# Patient Record
Sex: FEMALE | Race: BLACK OR AFRICAN AMERICAN
[De-identification: names, ages, dates, MRNs, and addresses within clinical notes are randomized per-mention and may not be internally consistent; named-entity substitution may affect disease eponyms.]

---

## 2021-05-12 PROBLEM — Z00.00 ENCOUNTER FOR PREVENTIVE HEALTH EXAMINATION: Status: ACTIVE | Noted: 2021-05-12

## 2021-05-21 ENCOUNTER — APPOINTMENT (OUTPATIENT)
Dept: VASCULAR SURGERY | Facility: CLINIC | Age: 61
End: 2021-05-21
Payer: COMMERCIAL

## 2021-05-21 DIAGNOSIS — S83.206A UNSPECIFIED TEAR OF UNSPECIFIED MENISCUS, CURRENT INJURY, RIGHT KNEE, INITIAL ENCOUNTER: ICD-10-CM

## 2021-05-21 PROCEDURE — 93971 EXTREMITY STUDY: CPT

## 2021-05-21 PROCEDURE — 99072 ADDL SUPL MATRL&STAF TM PHE: CPT

## 2021-05-21 PROCEDURE — 99242 OFF/OP CONSLTJ NEW/EST SF 20: CPT

## 2021-05-25 NOTE — PHYSICAL EXAM
[Alert] : alert [Oriented to Person] : oriented to person [Oriented to Place] : oriented to place [Oriented to Time] : oriented to time [Calm] : calm [2+] : right 2+ [Ankle Swelling (On Exam)] : not present [Varicose Veins Of Lower Extremities] : not present [] : not present [Abdomen Tenderness] : ~T ~M No abdominal tenderness [de-identified] : Well appearing  [de-identified] : NC/AT  [de-identified] : Supple  [de-identified] : FROM [de-identified] : RLE: mild R calf swelling with no reproducible pain to palpation. No palpable cords.

## 2021-05-25 NOTE — HISTORY OF PRESENT ILLNESS
[FreeTextEntry1] : 61 y/o F never smoker with recent episode of meniscus tear to the R leg presents to the office for initial consultation of R calf swelling. Pt referred by Dr. Neil Panchal. Patient reports feeling well overall; she does mentions slight discomfort to the leg. She is concern symptoms are due to a blood clot. Otherwise she denies any LE open sores/ulcers, fever, chills, sob, cp.

## 2021-05-25 NOTE — ADDENDUM
[FreeTextEntry1] : This note was written by Isela Nuno on 05/21/2021 acting as scribe for Maren Sandy M.D.\par \par I, Dr.Vicken Mcclendon, personally performed the evaluation and management (E/M) services for this new patient.  That E/M includes conducting the initial examination, assessing all conditions, and establishing the plan of care.  Today, my ACP, SAGRARIO Farooq, was here to observe my evaluation and management services for this patient to be followed going forward.\par \par \par \par

## 2021-05-25 NOTE — ASSESSMENT
[Arterial/Venous Disease] : arterial/venous disease [Medication Management] : medication management [FreeTextEntry1] : 59 y/o F here with R calf swelling. On exam, RLE: mild R calf swelling with no reproducible pain to palpation. No palpable cords. No skin breakdown.\par \par Plan: \par Reviewed RLE venous US completed in the office today showing: Negative DVT/SVT. \par Patient recommended she continue to stay active through daily walking. \par I explained R calf symptoms likely secondary to strenuous activity and will gradually resolve over time. \par No vascular surgery interventions recommended at this time. \par She may continue to f/u here as necessary.

## 2021-05-25 NOTE — PROCEDURE
[FreeTextEntry1] : I ordered a RLE venous US completed in the office today showing: Negative DVT/SVT